# Patient Record
Sex: MALE | Race: OTHER
[De-identification: names, ages, dates, MRNs, and addresses within clinical notes are randomized per-mention and may not be internally consistent; named-entity substitution may affect disease eponyms.]

---

## 2019-10-28 ENCOUNTER — HOSPITAL ENCOUNTER (EMERGENCY)
Dept: HOSPITAL 72 - EMR | Age: 29
Discharge: HOME | End: 2019-10-28
Payer: SELF-PAY

## 2019-10-28 VITALS — DIASTOLIC BLOOD PRESSURE: 74 MMHG | SYSTOLIC BLOOD PRESSURE: 122 MMHG

## 2019-10-28 VITALS — WEIGHT: 160 LBS | BODY MASS INDEX: 22.9 KG/M2 | HEIGHT: 70 IN

## 2019-10-28 DIAGNOSIS — S81.841A: Primary | ICD-10-CM
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
## 2019-10-28 DIAGNOSIS — Z23: ICD-10-CM
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
## 2019-10-28 PROCEDURE — 99283 EMERGENCY DEPT VISIT LOW MDM: CPT

## 2019-10-28 PROCEDURE — 90471 IMMUNIZATION ADMIN: CPT

## 2019-10-28 PROCEDURE — 90715 TDAP VACCINE 7 YRS/> IM: CPT

## 2019-10-28 NOTE — EMERGENCY ROOM REPORT
History of Present Illness


General


Chief Complaint:  Lower Extremity Injury


Source:  Patient





Present Illness


HPI


29-year-old male with no symptom past medical history here complaining of pain 

in the right callus that started after piece of wood went into as he was 

working with wood an hour prior to arrival.  Patient is rating pain 10 out of 

10 and a piece of wood is noted to have an excellent way however it seems like 

there is no insertion.  Patient reports that he tried to remove half of it 

himself.  Motor and sensory deficits are not noted and is neurovascularly 

intact.  Denies pain radiation, tingling and numbness.  Has not taken 

medication for symptom relief.  Is not up-to-date with tetanus shot.  Denies 

chest pain, shortness of breath, palpitation, fever and chills, and other 

associated symptoms.


Allergies:  


Coded Allergies:  


     No Known Allergies (Unverified , 10/28/19)





Patient History


Past Medical History:  see triage record


Past Surgical History:  unable to obtain


Pertinent Family History:  none


Social History:  Reports: smoking


Immunizations:  other - Tdap given today


Reviewed Nursing Documentation:  PMH: Agreed; PSxH: Agreed





Nursing Documentation-PMH


Past Medical History:  No Stated History





Review of Systems


All Other Systems:  negative except mentioned in HPI





Physical Exam





Vital Signs








  Date Time  Temp Pulse Resp B/P (MAP) Pulse Ox O2 Delivery O2 Flow Rate FiO2


 


10/28/19 16:10 98.6 75 18 122/74 (90) 97 Room Air  








Sp02 EP Interpretation:  reviewed, normal


General Appearance:  no apparent distress, alert, GCS 15, non-toxic


Head:  normocephalic, atraumatic


Eyes:  bilateral eye normal inspection, bilateral eye PERRL


ENT:  hearing grossly normal, normal pharynx, no angioedema, normal voice


Neck:  full range of motion, supple, supple/symm/no masses


Respiratory:  chest non-tender, lungs clear, normal breath sounds, no rhonchi, 

no wheezing, speaking full sentences


Cardiovascular #1:  regular rate, rhythm, no edema, no gallop, no murmur, 

normal capillary refill


Cardiovascular #2:  2+ dorsalis pedis (R), 2+ dorsalis pedis (L)


Gastrointestinal:  normal bowel sounds, non tender, soft, non-distended, no 

guarding, no rebound


Genitourinary:  no CVA tenderness


Musculoskeletal:  back normal, digits/nails normal, gait/station normal, normal 

range of motion, no calf tenderness


Neurologic:  alert, oriented x3, responsive, motor strength/tone normal, 

sensory intact, speech normal


Psychiatric:  judgement/insight normal, memory normal, mood/affect normal, no 

suicidal/homicidal ideation


Skin:  palpation normal, other - Puncture wound with foreign body noted right 

calf





Medical Decision Making


PA Attestation


All my diagnosis and treatment plans were reviewed ad discussed with my 

supervising physician Dr. Méndez


Diagnostic Impression:  


 Primary Impression:  


 Puncture wound


ER Course


29-year-old male with no symptom past medical history here complaining of pain 

in the right callus that started after piece of wood went into as he was 

working with wood an hour prior to arrival.  Patient is rating pain 10 out of 

10 and a piece of wood is noted to have an excellent way however it seems like 

there is no insertion.  Patient reports that he tried to remove half of it 

himself.  Motor and sensory deficits are not noted and is neurovascularly 

intact.  Denies pain radiation, tingling and numbness.  Has not taken 

medication for symptom relief.  Is not up-to-date with tetanus shot.  Denies 

chest pain, shortness of breath, palpitation, fever and chills, and other 

associated symptoms.





Ddx considered but are not limited to : Cellulitis, puncture wound with foreign 

body, puncture wound without foreign body, superficial infection, abscess





Vital signs: are WNL, pt. is afebrile





H&PE are most consistent with: Puncture wound with foreign body





ORDERS: Keflex, Bactrim, x-ray of right lower extremity, ibuprofen


ED INTERVENTIONS: Tdap, the half of splinter that was left in patient's leg was 

removed by Dr. De La O 





DISCHARGE: At this time pt. is stable for d/c to home. Will provide printed 

patient care instructions, and any necessary prescriptions. Care plan and 

follow up instructions have been discussed with the patient prior to discharge.

  Patient is aware that there might still be a small piece left since it was 

not done in the operating room however due to patient no insurance status 

patient agrees to follow-up in 2 days with Dr. De La O and if any worsening 

symptoms or fever and chills return to the emergency room sooner.


Other X-Ray Diagnostic Results


Other X-Ray Diagnostic Results :  


   X-Ray ordered:  Right tib-fib


   # of Views/Limited Vs Complete:  3 View


   Indication:  Pain


   EP Interpretation:  Yes


   PA Xray:  Interpretation reviewed, by supervising MD, and agrees with 

findings.


   Interpretation:  no dislocation, no soft tissue swelling, no fractures, 

other - Penetrating foreign body noted superficially


   Impression:  Other - Foreign body superficial causing puncture wound


   Electronically Signed by:  Josi Marroquin PA-C





Last Vital Signs








  Date Time  Temp Pulse Resp B/P (MAP) Pulse Ox O2 Delivery O2 Flow Rate FiO2


 


10/28/19 16:10 98.6 75 18 122/74 (90) 97 Room Air  








Disposition:  HOME, SELF-CARE


Condition:  Stable


Scripts


Ibuprofen (Ibu) 800 Mg Tablet


800 MG PO TID, #30 TAB


   Prov: Josi Hickman         10/28/19 


Cephalexin* (KEFLEX*) 500 Mg Capsule


500 MG ORAL EVERY 6 HOURS for 7 Days, #28 CAP


   Prov: Josi Hickman         10/28/19 


Trimethoprim/Sulfamethoxazole 160/800* (BACTRIM DS TABLET*) 1 Each Tablet


1 TAB ORAL TWICE A DAY for 7 Days, #14 TAB


   Prov: Josi Hickman         10/28/19


Patient Instructions:  Puncture Wound, Easy-to-Read





Additional Instructions:  


Take medication as directed follow-up with Dr. De La O this coming Wednesday.

  If worsening symptoms return to the emergency room.











Josi Hickman Oct 28, 2019 18:03

## 2019-10-29 NOTE — DIAGNOSTIC IMAGING REPORT
Indication: Trauma, possible foreign body

 

Technique: 2 views of the left tibia and fibula

 

Comparison:

 

Findings: There is a linear lucency extending from the skin surface perpendicular

into the posteromedial calf musculature. This measures approximately 4 cm in length

and 2 mm in width. No radiopaque foreign body demonstrated. No fractures. No

dislocations

 

Impression: Lucency within the as are medial calf musculature extending to the skin

surface. Uncertain as to whether this represents a puncture tract or a lucent foreign

body. Correlate with clinical findings

 

No radiopaque foreign body

 

No acute bony trauma

## 2022-07-13 NOTE — CONSULTATION
History of Present Illness


General


Date patient seen:  Oct 28, 2019


Reason for Hospitalization:  Lower Extremity Injury





Present Illness


HPI


29-year-old  otherwise healthy presented to emergency 

department with acute traumatic injury.  Patient was at work today and was 

struck by a piece of wood in the posterior calf of his right leg that 

transversed laterally medially through and through with a portion of the 

splinter being remained within the subcutis tissue.  Surgery called to 

evaluate.  Plain films noted.  Exam as below.  Patient states he tried to pull 

it out himself and it broke in half.  Lateral aspect was where he pulled out 

prior to it splitting.  No nausea vomiting fever chills.  Otherwise healthy.  

States pain.


Allergies:  


Coded Allergies:  


     No Known Allergies (Unverified , 10/28/19)





Medication History


No Active Prescriptions or Reported Meds





Patient History


History Provided By:  Patient


Healthcare decision maker





Resuscitation status





Advanced Directive on File








Review of Systems


Review of Symptoms


General ROS: no weight loss or fever


Psychological ROS: no depression or mood changes, no memory loss


Ophthalmic ROS: no visual changes or eye irritation


ENT ROS: no nasal congestion, hearing loss, dizziness


Allergy and Immunology ROS: no allergic symptoms or urticaria


Hematological and Lymphatic ROS: no swollen glands, unusual bleeding or bruising


Endocrine ROS: no polyuria, polydipsia, weight changes, temperature intolerance


Respiratory ROS: no cough, shortness of breath, or wheezing


Cardiovascular ROS: no chest pain or dyspnea on exertion


Gastrointestinal ROS: denies abdominal pain, bright red blood in stool.


Musculoskeletal ROS: no myalgias or arthralgias


Neurological ROS: no TIA or stroke symptoms


Dermatological ROS: no new or changing skin lesions, rashes or pruritis





Physical Exam


Physical Exam


General appearance:  alert, cooperative, no distress, appears stated age


Head:  Normocephalic, without obvious abnormality, atraumatic


Eyes:  conjunctivae/corneas clear. PERRL, EOM's intact. Fundi benign


Throat:  Lips, mucosa, and tongue normal. Teeth and gums normal


Neck:  supple, symmetrical, trachea midline, no adenopathy, thyroid: not 

enlarged, symmetric, no tenderness/mass/nodules, no carotid bruit and no JVD


Lungs:  clear to auscultation bilaterally


Heart:  regular rate and rhythm, S1, S2 normal, no murmur, click, rub or gallop


Abdomen:  soft, non-tender. Bowel sounds normal. No masses,  no organomegaly


Extremities:  extremities normal, right lower extremity mid calf there is a 1 

mm entry on the lateral aspect with no palpable foreign object on the medial 

aspect there is a projecting point of a foreign object wood splinter partially  

transversing through a 1 mm opening, no cyanosis or edema


Pulses:  2+ and symmetric


Skin:  Skin color, texture, turgor normal. No rashes or lesions


Neurologic:  Grossly normal





Last 24 Hour Vital Signs








  Date Time  Temp Pulse Resp B/P (MAP) Pulse Ox O2 Delivery O2 Flow Rate FiO2


 


10/28/19 16:10 98.6 75 18 122/74 (90) 97 Room Air  








Height (Feet):  5


Height (Inches):  10.00


Weight (Pounds):  160





Assessment/Plan


Problem List:  


(1) Puncture wound


Assessment & Plan:  29-year-old male with a splinter with puncture wound to the 

right mid calf posterior entry lateral partial exit medial.  Patient attempted 

to remove it himself and broke it in half with the lateral aspect being removed 

but the medial aspect still being position.  Given these findings incision and 

removal of foreign object indicating recommended.  Consent obtained from 

patient after explaining the risk medicine alternatives.  





Procedure note


Patient was made comfortable at the bedside in the emergency department.  The 

right leg was prepped draped in standard surgical fashion.  1% lidocaine with 

epinephrine was infiltrated in the entry, transversing and exit wound.  Once 

appropriate anesthetic effect was achieved a small incision was made using a 

fresh 11 scalpel at the exit site.  The splinter foreign object was grasped 

with locking hemostat and removed without complication.  The entire track was 

evaluated no other foreign objects or pieces were identified.  Tract was 

irrigated and cleansed.  Packing sami were placed in the entry and exit site.  

Wound was dressed.  Care instructions were given to patient.  Prescription for 

antibiotics were given as well as follow-up.  Patient on procedure well.





Okay to discharge from surgical standpoint


Follow-up in office on Wednesday, 10/30/2019 at 1 PM


Rx is written


Return if worsening condition.


ICD Codes:  T14.8XXA - Other injury of unspecified body region, initial 

encounter


SNOMED:  [id number]











DeepBaltazar robbya Oct 28, 2019 18:06 Thank you for allowing us to care for your child today. Please continue to use tylenol or motrin every 6 hours as needed for fever or pain. Follow up with the ENT doctor if you have any other concerns.     Return to the Emergency Department if having fevers for more than 5 days, unable to drink fluids, or anything else of concern.